# Patient Record
Sex: FEMALE | Race: WHITE | Employment: FULL TIME | URBAN - METROPOLITAN AREA
[De-identification: names, ages, dates, MRNs, and addresses within clinical notes are randomized per-mention and may not be internally consistent; named-entity substitution may affect disease eponyms.]

---

## 2021-03-05 ENCOUNTER — IMMUNIZATION (OUTPATIENT)
Dept: NURSING | Facility: CLINIC | Age: 51
End: 2021-03-05
Payer: COMMERCIAL

## 2021-03-05 PROCEDURE — 91301 PR COVID VAC MODERNA 100 MCG/0.5 ML IM: CPT

## 2021-03-05 PROCEDURE — 0011A PR COVID VAC MODERNA 100 MCG/0.5 ML IM: CPT

## 2021-03-20 ENCOUNTER — HEALTH MAINTENANCE LETTER (OUTPATIENT)
Age: 51
End: 2021-03-20

## 2021-04-02 ENCOUNTER — IMMUNIZATION (OUTPATIENT)
Dept: NURSING | Facility: CLINIC | Age: 51
End: 2021-04-02
Attending: PHYSICIAN ASSISTANT
Payer: COMMERCIAL

## 2021-04-02 PROCEDURE — 91301 PR COVID VAC MODERNA 100 MCG/0.5 ML IM: CPT

## 2021-04-02 PROCEDURE — 0012A PR COVID VAC MODERNA 100 MCG/0.5 ML IM: CPT

## 2021-08-31 ENCOUNTER — LAB REQUISITION (OUTPATIENT)
Dept: LAB | Facility: CLINIC | Age: 51
End: 2021-08-31

## 2021-08-31 DIAGNOSIS — Z13.220 ENCOUNTER FOR SCREENING FOR LIPOID DISORDERS: ICD-10-CM

## 2021-08-31 LAB
CHOLEST SERPL-MCNC: 209 MG/DL
HDLC SERPL-MCNC: 76 MG/DL
LDLC SERPL CALC-MCNC: 121 MG/DL
TRIGL SERPL-MCNC: 60 MG/DL

## 2021-08-31 PROCEDURE — 80061 LIPID PANEL: CPT | Performed by: FAMILY MEDICINE

## 2021-10-24 ENCOUNTER — HEALTH MAINTENANCE LETTER (OUTPATIENT)
Age: 51
End: 2021-10-24

## 2022-01-07 ENCOUNTER — OFFICE VISIT (OUTPATIENT)
Dept: FAMILY MEDICINE | Facility: CLINIC | Age: 52
End: 2022-01-07
Payer: COMMERCIAL

## 2022-01-07 VITALS
HEART RATE: 95 BPM | DIASTOLIC BLOOD PRESSURE: 80 MMHG | WEIGHT: 170.7 LBS | TEMPERATURE: 98.6 F | OXYGEN SATURATION: 99 % | SYSTOLIC BLOOD PRESSURE: 130 MMHG

## 2022-01-07 DIAGNOSIS — R10.31 ABDOMINAL PAIN, RIGHT LOWER QUADRANT: Primary | ICD-10-CM

## 2022-01-07 LAB
ALBUMIN UR-MCNC: NEGATIVE MG/DL
APPEARANCE UR: CLEAR
BACTERIA #/AREA URNS HPF: ABNORMAL /HPF
BILIRUB UR QL STRIP: NEGATIVE
COLOR UR AUTO: YELLOW
ERYTHROCYTE [DISTWIDTH] IN BLOOD BY AUTOMATED COUNT: 12.7 % (ref 10–15)
GLUCOSE UR STRIP-MCNC: NEGATIVE MG/DL
HCG UR QL: NEGATIVE
HCT VFR BLD AUTO: 40.6 % (ref 35–47)
HGB BLD-MCNC: 13.3 G/DL (ref 11.7–15.7)
HGB UR QL STRIP: ABNORMAL
KETONES UR STRIP-MCNC: NEGATIVE MG/DL
LEUKOCYTE ESTERASE UR QL STRIP: NEGATIVE
MCH RBC QN AUTO: 29.8 PG (ref 26.5–33)
MCHC RBC AUTO-ENTMCNC: 32.8 G/DL (ref 31.5–36.5)
MCV RBC AUTO: 91 FL (ref 78–100)
NITRATE UR QL: NEGATIVE
PH UR STRIP: 5 [PH] (ref 5–8)
PLATELET # BLD AUTO: 228 10E3/UL (ref 150–450)
RBC # BLD AUTO: 4.47 10E6/UL (ref 3.8–5.2)
RBC #/AREA URNS AUTO: ABNORMAL /HPF
SP GR UR STRIP: 1.02 (ref 1–1.03)
SQUAMOUS #/AREA URNS AUTO: ABNORMAL /LPF
UROBILINOGEN UR STRIP-ACNC: 0.2 E.U./DL
WBC # BLD AUTO: 11.4 10E3/UL (ref 4–11)
WBC #/AREA URNS AUTO: ABNORMAL /HPF

## 2022-01-07 PROCEDURE — 99204 OFFICE O/P NEW MOD 45 MIN: CPT | Performed by: EMERGENCY MEDICINE

## 2022-01-07 PROCEDURE — 85027 COMPLETE CBC AUTOMATED: CPT | Performed by: EMERGENCY MEDICINE

## 2022-01-07 PROCEDURE — 81001 URINALYSIS AUTO W/SCOPE: CPT | Performed by: EMERGENCY MEDICINE

## 2022-01-07 PROCEDURE — 81025 URINE PREGNANCY TEST: CPT | Performed by: EMERGENCY MEDICINE

## 2022-01-07 PROCEDURE — 36415 COLL VENOUS BLD VENIPUNCTURE: CPT | Performed by: EMERGENCY MEDICINE

## 2022-01-08 NOTE — PROGRESS NOTES
Impression:  Sudden onset of acute abdominal pain with some right lower quadrant tenderness.  Differential diagnosis includes kidney stone, ruptured ovarian cyst, ovarian torsion.  White count is slightly elevated.  Urine shows a little bit of heme but 0-2 red cells and no white cells or bacteria.  Pregnancy test is negative.  Abdominal CT scan shows normal appendix and some fluid near right ovary.  Symptoms are resolving spontaneously.  Probably represents ruptured ovarian cyst.  No evidence for surgical abdomen    Plan:  Ibuprofen or Tylenol for pain, return or seek care immediately if new or worsening symptoms, follow-up with primary care on Monday if no better      Chief Complaint:  Patient presents with:  Abdominal Pain: lower abd into back, started after had dinner tonight         HPI:   Carmen Alejo is a 51 year old female who presents to this clinic for the evaluation of abdominal pain.  Patient developed sudden onset of severe lower midline abdominal pain that began about 1 hour prior to my examination.  She was previously feeling well.  The pain was maximal at onset and located in the midline in the suprapubic area.  She had no associated nausea or vomiting.  No diarrhea.  No melena or hematochezia.  No dysuria or hematuria.  No vaginal bleeding or discharge.  Pain doubled her over in pain so she immediately came here for evaluation.  She has no past medical history and her previous only surgery was a hip replacement      PMH:   No past medical history on file.  No past surgical history on file.      ROS:  All other systems negative    Meds:  No current outpatient medications on file.        Social:  Social History     Socioeconomic History     Marital status:      Spouse name: Not on file     Number of children: Not on file     Years of education: Not on file     Highest education level: Not on file   Occupational History     Not on file   Tobacco Use     Smoking status: Never Smoker      Smokeless tobacco: Never Used   Substance and Sexual Activity     Alcohol use: Not on file     Drug use: Not on file     Sexual activity: Not on file   Other Topics Concern     Not on file   Social History Narrative     Not on file     Social Determinants of Health     Financial Resource Strain: Not on file   Food Insecurity: Not on file   Transportation Needs: Not on file   Physical Activity: Not on file   Stress: Not on file   Social Connections: Not on file   Intimate Partner Violence: Not on file   Housing Stability: Not on file         Physical Exam:  Vitals:    01/07/22 1934   BP: 130/80   Pulse: 95   Temp: 98.6  F (37  C)   TempSrc: Tympanic   SpO2: 99%   Weight: 77.4 kg (170 lb 11.2 oz)      Patient is awake, alert, no distress  Eyes: PERRL, EOMI  Head: Atraumatic and normocephalic  Abdomen: There is right lower quadrant tenderness to deep palpation with no rebound tenderness, no mass  Pelvic examination: External genitalia normal vagina thin white discharge otherwise normal cervix is closed there is no cervical motion tenderness.  There is mild uterine tenderness, no adnexal tenderness or masses  Skin: No lesions or rash  Neuro: Normal motor and sensory function in all extremities  Psych: Awake, alert, normally responsive      Results:    Results for orders placed or performed in visit on 01/07/22 (from the past 24 hour(s))   UA macro with reflex to Microscopic and Culture - Clinc Collect    Specimen: Urine, Clean Catch   Result Value Ref Range    Color Urine Yellow Colorless, Straw, Light Yellow, Yellow    Appearance Urine Clear Clear    Glucose Urine Negative Negative mg/dL    Bilirubin Urine Negative Negative    Ketones Urine Negative Negative mg/dL    Specific Gravity Urine 1.020 1.005 - 1.030    Blood Urine Small (A) Negative    pH Urine 5.0 5.0 - 8.0    Protein Albumin Urine Negative Negative mg/dL    Urobilinogen Urine 0.2 0.2, 1.0 E.U./dL    Nitrite Urine Negative Negative    Leukocyte Esterase  Urine Negative Negative   Urine Microscopic   Result Value Ref Range    Bacteria Urine None Seen None Seen /HPF    RBC Urine 0-2 0-2 /HPF /HPF    WBC Urine None Seen 0-5 /HPF /HPF    Squamous Epithelials Urine Few (A) None Seen /LPF    Narrative    Urine Culture not indicated   HCG qualitative urine   Result Value Ref Range    hCG Urine Qualitative Negative Negative   CBC with platelets   Result Value Ref Range    WBC Count 11.4 (H) 4.0 - 11.0 10e3/uL    RBC Count 4.47 3.80 - 5.20 10e6/uL    Hemoglobin 13.3 11.7 - 15.7 g/dL    Hematocrit 40.6 35.0 - 47.0 %    MCV 91 78 - 100 fL    MCH 29.8 26.5 - 33.0 pg    MCHC 32.8 31.5 - 36.5 g/dL    RDW 12.7 10.0 - 15.0 %    Platelet Count 228 150 - 450 10e3/uL   CT Abdomen Pelvis w Contrast    Narrative    EXAM DATE:         01/07/2022    EXAM: CT ABDOMEN, PELVIS WITH CONTRAST  LOCATION: [facility]  DATE/TIME: 1/7/2022 8:00 PM    INDICATION: Pain  COMPARISON: No prior CT of the abdomen or pelvis; outside abdominal ultrasound 07/29/2019  TECHNIQUE: CT scan of the abdomen and pelvis was performed following injection of IV contrast. Multiplanar reformats were obtained. Dose reduction techniques were used.  CONTRAST: 100ml of ISOVUE 370 was administered.    FINDINGS:  LOWER CHEST: Normal.    HEPATOBILIARY: A few circumscribed liver cysts are present, definitively benign. No enhancing liver lesions. Nondistended gallbladder. No calcified gallstones. No bile duct enlargement.    PANCREAS: Normal.    SPLEEN: Normal.    ADRENAL GLANDS: Normal.    KIDNEYS/BLADDER: Normal.    BOWEL: Ingested material in the stomach. No gastric wall thickening. The small bowel is normal. Appendix is normal caliber. No appendiceal wall thickening or mucosal hyperenhancement. Stool is present in the cecum, as ascending, and transverse colon.   Minimal stool burden in the descending and rectosigmoid colon. No bowel wall thickening.    LYMPH NODES: Normal.    VASCULATURE:  Abdominal aorta is normal caliber.    PELVIC ORGANS: Normal size uterus. Small amount of free fluid is present in the right adnexa and layering into the cul-de-sac. Right ovary is normal in size. Left ovary has a physiologic 18 x 26 mm simple fluid attenuation cyst.    MUSCULOSKELETAL: Minimal degenerative osteophytes mid lumbar spine. No spondylolisthesis.    IMPRESSION:    Small amount of free fluid is present in the right lower quadrant near the right ovary and layering into the pelvis. This fluid is near the appendix however the appendix is morphologically normal. Findings most likely reflect physiologic fluid from   recent ovulation.    Findings discussed with Dr. Vee at 8:49 PM 01/07/2022                        Og Vee MD

## 2022-04-10 ENCOUNTER — HEALTH MAINTENANCE LETTER (OUTPATIENT)
Age: 52
End: 2022-04-10

## 2022-05-05 ENCOUNTER — LAB REQUISITION (OUTPATIENT)
Dept: LAB | Facility: CLINIC | Age: 52
End: 2022-05-05
Payer: COMMERCIAL

## 2022-05-05 DIAGNOSIS — M79.672 PAIN IN LEFT FOOT: ICD-10-CM

## 2022-05-05 LAB — URATE SERPL-MCNC: 5.4 MG/DL (ref 2–7.5)

## 2022-05-05 PROCEDURE — 84550 ASSAY OF BLOOD/URIC ACID: CPT | Mod: ORL | Performed by: FAMILY MEDICINE

## 2022-10-16 ENCOUNTER — HEALTH MAINTENANCE LETTER (OUTPATIENT)
Age: 52
End: 2022-10-16

## 2023-06-01 ENCOUNTER — HEALTH MAINTENANCE LETTER (OUTPATIENT)
Age: 53
End: 2023-06-01

## 2023-09-06 ENCOUNTER — LAB REQUISITION (OUTPATIENT)
Dept: LAB | Facility: CLINIC | Age: 53
End: 2023-09-06
Payer: COMMERCIAL

## 2023-09-06 DIAGNOSIS — Z01.419 ENCOUNTER FOR GYNECOLOGICAL EXAMINATION (GENERAL) (ROUTINE) WITHOUT ABNORMAL FINDINGS: ICD-10-CM

## 2023-09-06 PROCEDURE — G0145 SCR C/V CYTO,THINLAYER,RESCR: HCPCS | Mod: ORL | Performed by: FAMILY MEDICINE

## 2023-09-06 PROCEDURE — 87624 HPV HI-RISK TYP POOLED RSLT: CPT | Mod: ORL | Performed by: FAMILY MEDICINE

## 2023-09-08 LAB
BKR LAB AP GYN ADEQUACY: NORMAL
BKR LAB AP GYN INTERPRETATION: NORMAL
BKR LAB AP HPV REFLEX: NORMAL
BKR LAB AP LMP: NORMAL
BKR LAB AP PREVIOUS ABNORMAL: NORMAL
PATH REPORT.COMMENTS IMP SPEC: NORMAL
PATH REPORT.COMMENTS IMP SPEC: NORMAL
PATH REPORT.RELEVANT HX SPEC: NORMAL

## 2023-09-12 LAB
HUMAN PAPILLOMA VIRUS 16 DNA: NEGATIVE
HUMAN PAPILLOMA VIRUS 18 DNA: NEGATIVE
HUMAN PAPILLOMA VIRUS FINAL DIAGNOSIS: NORMAL
HUMAN PAPILLOMA VIRUS OTHER HR: NEGATIVE

## 2023-11-02 ENCOUNTER — LAB REQUISITION (OUTPATIENT)
Dept: LAB | Facility: CLINIC | Age: 53
End: 2023-11-02
Payer: COMMERCIAL

## 2023-11-02 DIAGNOSIS — R30.0 DYSURIA: ICD-10-CM

## 2023-11-02 PROCEDURE — 87086 URINE CULTURE/COLONY COUNT: CPT | Mod: ORL | Performed by: NURSE PRACTITIONER

## 2023-11-03 LAB — BACTERIA UR CULT: NORMAL

## 2024-02-07 ENCOUNTER — LAB REQUISITION (OUTPATIENT)
Dept: LAB | Facility: CLINIC | Age: 54
End: 2024-02-07
Payer: COMMERCIAL

## 2024-02-07 DIAGNOSIS — U07.1 COVID-19: ICD-10-CM

## 2024-02-07 LAB
ANION GAP SERPL CALCULATED.3IONS-SCNC: 12 MMOL/L (ref 7–15)
BUN SERPL-MCNC: 18.7 MG/DL (ref 6–20)
CALCIUM SERPL-MCNC: 9.9 MG/DL (ref 8.6–10)
CHLORIDE SERPL-SCNC: 105 MMOL/L (ref 98–107)
CREAT SERPL-MCNC: 0.73 MG/DL (ref 0.51–0.95)
DEPRECATED HCO3 PLAS-SCNC: 26 MMOL/L (ref 22–29)
EGFRCR SERPLBLD CKD-EPI 2021: >90 ML/MIN/1.73M2
GLUCOSE SERPL-MCNC: 97 MG/DL (ref 70–99)
POTASSIUM SERPL-SCNC: 4.9 MMOL/L (ref 3.4–5.3)
SODIUM SERPL-SCNC: 143 MMOL/L (ref 135–145)

## 2024-02-07 PROCEDURE — 80048 BASIC METABOLIC PNL TOTAL CA: CPT | Mod: ORL | Performed by: PHYSICIAN ASSISTANT

## 2024-08-10 ENCOUNTER — HEALTH MAINTENANCE LETTER (OUTPATIENT)
Age: 54
End: 2024-08-10